# Patient Record
Sex: MALE
[De-identification: names, ages, dates, MRNs, and addresses within clinical notes are randomized per-mention and may not be internally consistent; named-entity substitution may affect disease eponyms.]

---

## 2024-01-12 PROBLEM — Z00.00 ENCOUNTER FOR PREVENTIVE HEALTH EXAMINATION: Status: ACTIVE | Noted: 2024-01-12

## 2024-01-16 ENCOUNTER — APPOINTMENT (OUTPATIENT)
Dept: UROLOGY | Facility: CLINIC | Age: 19
End: 2024-01-16
Payer: COMMERCIAL

## 2024-01-16 VITALS
SYSTOLIC BLOOD PRESSURE: 116 MMHG | HEART RATE: 104 BPM | DIASTOLIC BLOOD PRESSURE: 75 MMHG | OXYGEN SATURATION: 98 % | TEMPERATURE: 98.3 F

## 2024-01-16 PROCEDURE — 99204 OFFICE O/P NEW MOD 45 MIN: CPT

## 2024-01-16 NOTE — HISTORY OF PRESENT ILLNESS
[FreeTextEntry1] : Diagnosis: Gender Dysphoria  Consult for vaginoplasty  This is a 18-year-old male-to-female transgender patient named Lydia Here today to discuss vaginoplasty. Pronouns: She/Her  She has been living as a female in public and at work for 3.5 (March 2020) years. She has been on hormone therapy for 3.5 (March 2020) years.  Her goals are natural female appearance, sit to urinate, sensation and penetrative intercourse.  She has letters from her therapist, confirming the diagnosis of gender dysphoria.  MEDHX: estragon, spironolactone, progesterone, Latuda, buspirone, vitamin D, PREP SURGHX: tonsillectomy  FAMHX: noncontributory  SOCIAL: single, currently in college at [employer] studying business in her freshman year. Smoking: current smoker (vape) which she does a couple times a day.  Has been vaping for the past 10 months.  She is working on stopping the habit. Alcohol: occasional ETOH, wine on the weekend, denies any binge drinking habits. Exercise:  2 x week  Diet: Balanced Diet.  Living as female 3.5 years. Hormone therapy for 3.5 years.  This is a male-to-female transgender patient who presents with male genitalia and associated gender dysphoria that is well-documented.  For the last 3.5 years they have been living as a female in public and at work. They have been on hormone therapy for greater than 3 years. This condition has a significant impact on their activities of daily living including hygiene, exercise and overall mental well-being. I believe they would benefit greatly from staged gender affirmation surgery.  They meet all WPATH criteria according to the latest Standards of Care to proceed with gender affirming surgery. These include: 1. Persistent, well documented gender dysphoria; 2. Capacity to make a fully informed decision and consent for treatment; 3. Age of majority in [place]; 4. If significant medical or mental health concerns are present, they must be reasonably well controlled.  In addition, prior to genital affirmation surgery, they will meet the following criteria: 1. 12 continuous months of hormone therapy as appropriate to the patients gender goals (unless not clinically indicated for the individual); 2. 12 continuous months of living as the desired gender in public and at work or school  We will plan for combined approach with Urology, this will be robotic-assisted, penile-inversion technique including penectomy, orchiectomy, creation of island neurovascular giorgi-clitoral flap from glans penis, vaginoplasty, peritoneal graft for giorgi-vaginal lining, along with the creation of a potential space for the giorgi-vaginal canal along the rectoprostatic plane along Denonvilliers fascia via use of the da Alicia Robot (Urology).  The risks, benefits and alternatives to treatment were discussed at length with the patient. Potential complications were discussed as well and include but are not limited to infection, bleeding, scarring, PE/DVT and even death. In addition, specific complications include dehiscence, injury to bowel or bladder resulting in incontinence, and anorgasmia. All questions were answered in detail. She understand and wish to proceed. Informed consent was obtained.  Surgery to be scheduled by Dr. Bernardo's office.  Verbal instructions were given to the patient today regarding requirements for preoperative planning and clearance. In addition, they were provided with a checklist. Patient was given a list of medications to discontinue or avoid for 2 weeks prior to surgery so as to minimize bleeding and DVT risks.  Clearance. The patient will need pre-operative clearance provided by primary care provider: SHAWN Hayden  Labs: She will need the following pre-operative labs: CBC, CMP, PT/PTT, UCx  Post-op garments. The patient was advised that post-operative garments will be required to optimize outcomes and was provided with a catalog . Photos taken: photos were taken today of the patient after consent was obtained  A chaperone was present for the entire duration of the visit, Kimberly Hussein NP.     Time spent with patient, face-to-face (greater than 50% for counseling) . - Consultation:   _____minutes

## 2024-01-17 ENCOUNTER — TRANSCRIPTION ENCOUNTER (OUTPATIENT)
Age: 19
End: 2024-01-17

## 2024-03-08 DIAGNOSIS — F64.0 TRANSSEXUALISM: ICD-10-CM

## 2024-04-01 ENCOUNTER — APPOINTMENT (OUTPATIENT)
Dept: UROLOGY | Facility: HOSPITAL | Age: 19
End: 2024-04-01